# Patient Record
Sex: FEMALE | Race: WHITE | Employment: UNEMPLOYED | URBAN - METROPOLITAN AREA
[De-identification: names, ages, dates, MRNs, and addresses within clinical notes are randomized per-mention and may not be internally consistent; named-entity substitution may affect disease eponyms.]

---

## 2018-06-01 ENCOUNTER — HOSPITAL ENCOUNTER (EMERGENCY)
Age: 43
Discharge: HOME | End: 2018-06-02
Attending: EMERGENCY MEDICINE
Payer: MEDICAID

## 2018-06-01 VITALS
SYSTOLIC BLOOD PRESSURE: 194 MMHG | OXYGEN SATURATION: 100 % | WEIGHT: 219 LBS | DIASTOLIC BLOOD PRESSURE: 125 MMHG | RESPIRATION RATE: 14 BRPM | HEART RATE: 106 BPM | TEMPERATURE: 98 F

## 2018-06-01 DIAGNOSIS — R10.2 PELVIC PAIN IN FEMALE: Primary | ICD-10-CM

## 2018-06-01 DIAGNOSIS — Z91.14 NONCOMPLIANCE WITH MEDICATION REGIMEN: ICD-10-CM

## 2018-06-01 DIAGNOSIS — E11.65 TYPE 2 DIABETES MELLITUS WITH HYPERGLYCEMIA, WITHOUT LONG-TERM CURRENT USE OF INSULIN (HCC): ICD-10-CM

## 2018-06-01 PROCEDURE — 99284 EMERGENCY DEPT VISIT MOD MDM: CPT

## 2018-06-02 LAB
ANION GAP SERPL CALC-SCNC: 7 MMOL/L (ref 3–18)
APPEARANCE UR: CLEAR
BASOPHILS # BLD: 0 K/UL (ref 0–0.06)
BASOPHILS NFR BLD: 0 % (ref 0–2)
BILIRUB UR QL: NEGATIVE
BUN SERPL-MCNC: 16 MG/DL (ref 7–18)
BUN/CREAT SERPL: 22 (ref 12–20)
CALCIUM SERPL-MCNC: 8.6 MG/DL (ref 8.5–10.1)
CHLORIDE SERPL-SCNC: 101 MMOL/L (ref 100–108)
CO2 SERPL-SCNC: 27 MMOL/L (ref 21–32)
COLOR UR: YELLOW
CREAT SERPL-MCNC: 0.72 MG/DL (ref 0.6–1.3)
DIFFERENTIAL METHOD BLD: ABNORMAL
EOSINOPHIL # BLD: 0.2 K/UL (ref 0–0.4)
EOSINOPHIL NFR BLD: 2 % (ref 0–5)
ERYTHROCYTE [DISTWIDTH] IN BLOOD BY AUTOMATED COUNT: 13 % (ref 11.6–14.5)
GLUCOSE BLD STRIP.AUTO-MCNC: 274 MG/DL (ref 70–110)
GLUCOSE BLD STRIP.AUTO-MCNC: 355 MG/DL (ref 70–110)
GLUCOSE SERPL-MCNC: 362 MG/DL (ref 74–99)
GLUCOSE UR STRIP.AUTO-MCNC: >1000 MG/DL
HCG UR QL: NEGATIVE
HCT VFR BLD AUTO: 42.3 % (ref 35–45)
HGB BLD-MCNC: 14.7 G/DL (ref 12–16)
HGB UR QL STRIP: NEGATIVE
KETONES UR QL STRIP.AUTO: ABNORMAL MG/DL
LEUKOCYTE ESTERASE UR QL STRIP.AUTO: NEGATIVE
LYMPHOCYTES # BLD: 4.3 K/UL (ref 0.9–3.6)
LYMPHOCYTES NFR BLD: 39 % (ref 21–52)
MCH RBC QN AUTO: 31.1 PG (ref 24–34)
MCHC RBC AUTO-ENTMCNC: 34.8 G/DL (ref 31–37)
MCV RBC AUTO: 89.4 FL (ref 74–97)
MONOCYTES # BLD: 0.8 K/UL (ref 0.05–1.2)
MONOCYTES NFR BLD: 8 % (ref 3–10)
NEUTS SEG # BLD: 5.6 K/UL (ref 1.8–8)
NEUTS SEG NFR BLD: 51 % (ref 40–73)
NITRITE UR QL STRIP.AUTO: NEGATIVE
PH UR STRIP: 5.5 [PH] (ref 5–8)
PLATELET # BLD AUTO: 252 K/UL (ref 135–420)
PMV BLD AUTO: 11.8 FL (ref 9.2–11.8)
POTASSIUM SERPL-SCNC: 4.1 MMOL/L (ref 3.5–5.5)
PROT UR STRIP-MCNC: NEGATIVE MG/DL
RBC # BLD AUTO: 4.73 M/UL (ref 4.2–5.3)
SERVICE CMNT-IMP: NORMAL
SODIUM SERPL-SCNC: 135 MMOL/L (ref 136–145)
SP GR UR REFRACTOMETRY: >1.03 (ref 1–1.03)
UROBILINOGEN UR QL STRIP.AUTO: 0.2 EU/DL (ref 0.2–1)
WBC # BLD AUTO: 11 K/UL (ref 4.6–13.2)
WET PREP GENITAL: NORMAL

## 2018-06-02 PROCEDURE — 81025 URINE PREGNANCY TEST: CPT | Performed by: EMERGENCY MEDICINE

## 2018-06-02 PROCEDURE — 74011000250 HC RX REV CODE- 250: Performed by: EMERGENCY MEDICINE

## 2018-06-02 PROCEDURE — 96372 THER/PROPH/DIAG INJ SC/IM: CPT

## 2018-06-02 PROCEDURE — 81003 URINALYSIS AUTO W/O SCOPE: CPT | Performed by: EMERGENCY MEDICINE

## 2018-06-02 PROCEDURE — 80048 BASIC METABOLIC PNL TOTAL CA: CPT | Performed by: EMERGENCY MEDICINE

## 2018-06-02 PROCEDURE — 85025 COMPLETE CBC W/AUTO DIFF WBC: CPT | Performed by: EMERGENCY MEDICINE

## 2018-06-02 PROCEDURE — 82962 GLUCOSE BLOOD TEST: CPT

## 2018-06-02 PROCEDURE — 74011250637 HC RX REV CODE- 250/637: Performed by: EMERGENCY MEDICINE

## 2018-06-02 PROCEDURE — 74011636637 HC RX REV CODE- 636/637: Performed by: EMERGENCY MEDICINE

## 2018-06-02 PROCEDURE — 87591 N.GONORRHOEAE DNA AMP PROB: CPT | Performed by: EMERGENCY MEDICINE

## 2018-06-02 PROCEDURE — 74011250636 HC RX REV CODE- 250/636: Performed by: EMERGENCY MEDICINE

## 2018-06-02 PROCEDURE — 87210 SMEAR WET MOUNT SALINE/INK: CPT | Performed by: EMERGENCY MEDICINE

## 2018-06-02 PROCEDURE — 96360 HYDRATION IV INFUSION INIT: CPT

## 2018-06-02 RX ORDER — AZITHROMYCIN 250 MG/1
1000 TABLET, FILM COATED ORAL
Status: COMPLETED | OUTPATIENT
Start: 2018-06-02 | End: 2018-06-02

## 2018-06-02 RX ORDER — METFORMIN HYDROCHLORIDE 500 MG/1
500 TABLET ORAL 2 TIMES DAILY WITH MEALS
Qty: 30 TAB | Refills: 1 | Status: SHIPPED | OUTPATIENT
Start: 2018-06-02 | End: 2018-08-08 | Stop reason: DRUGHIGH

## 2018-06-02 RX ADMIN — SODIUM CHLORIDE 1000 ML: 900 INJECTION, SOLUTION INTRAVENOUS at 02:36

## 2018-06-02 RX ADMIN — LIDOCAINE HYDROCHLORIDE 250 MG: 10 INJECTION, SOLUTION EPIDURAL; INFILTRATION; INTRACAUDAL; PERINEURAL at 02:39

## 2018-06-02 RX ADMIN — INSULIN HUMAN 10 UNITS: 100 INJECTION, SOLUTION PARENTERAL at 02:37

## 2018-06-02 RX ADMIN — AZITHROMYCIN 1000 MG: 250 TABLET, FILM COATED ORAL at 02:39

## 2018-06-02 NOTE — DISCHARGE INSTRUCTIONS
Noninsulin Medicines for Type 2 Diabetes: Care Instructions  Your Care Instructions    There are different types of noninsulin medicines for diabetes. Each works in a different way. But they all help you control your blood sugar. Some types help your body make insulin to lower your blood sugar. Others lower how much insulin your body needs. Some can slow how fast your body digests sugars. And some can remove extra glucose through your urine. · Alpha-glucosidase inhibitors. These keep starches from breaking down. This means that they lower the amount of glucose absorbed when you eat. They don't help your body make more insulin. So they will not cause low blood sugar unless you use them with other medicines for diabetes. They include acarbose and miglitol. · DPP-4 inhibitors. These help your body raise the level of insulin after you eat. They also help your body make less of a hormone that raises blood sugar. They include linagliptin, saxagliptin, and sitagliptin. · Incretin hormones (GLP-1 receptor agonists). Your body makes a protein that can raise your insulin level. It also can lower your blood sugar and make you less hungry. You can get shots of hormones that work the same way. They include exenatide and liraglutide. · Meglitinides. These help your body release insulin. They also help slow how your body digests sugars. So they can keep your blood sugar from rising too fast after you eat. They include nateglinide and repaglinide. · Metformin. This lowers how much glucose your liver makes. And it helps you respond better to insulin. It also lowers the amount of stored sugar that your liver releases when you are not eating. · SGLT2 inhibitors. These help to remove extra glucose through your urine. They may also help some people lose weight. They include canagliflozin, dapagliflozin, and empagliflozin. · Sulfonylureas. These help your body release more insulin. Some work for many hours.  They can cause low blood sugar if you don't eat as you planned. They include glipizide and glyburide. · Thiazolidinediones. These reduce the amount of blood glucose. They also help you respond better to insulin. They include pioglitazone and rosiglitazone. You may need to take more than one medicine for diabetes. Two or more medicines may work better to lower your blood sugar level than just one does. Follow-up care is a key part of your treatment and safety. Be sure to make and go to all appointments, and call your doctor if you are having problems. It's also a good idea to know your test results and keep a list of the medicines you take. How can you care for yourself at home? · Eat a healthy diet. Get some exercise each day. This may help you to reduce how much medicine you need. · Do not take other prescription or over-the-counter medicines, vitamins, herbal products, or supplements without talking to your doctor first. Some medicines for type 2 diabetes can cause problems with other medicines or supplements. · Tell your doctor if you plan to get pregnant. Some of these drugs are not safe for pregnant women. · Be safe with medicines. Take your medicines exactly as prescribed. Meglitinides and sulfonylureas can cause your blood sugar to drop very low. Call your doctor if you think you are having a problem with your medicine. · Check your blood sugar often. You can use a glucose monitor. Keeping track can help you know how certain foods, activities, and medicines affect your blood sugar. And it can help you keep your blood sugar from getting so low that it's not safe. When should you call for help? Call 911 anytime you think you may need emergency care. For example, call if:  ? · You passed out (lost consciousness). ? · You are confused or cannot think clearly. ? · Your blood sugar is very high or very low. ? Watch closely for changes in your health, and be sure to contact your doctor if:  ? · Your blood sugar stays outside the level your doctor set for you. ? · You have any problems. Where can you learn more? Go to http://ann marie-perlita.info/. Enter H153 in the search box to learn more about \"Noninsulin Medicines for Type 2 Diabetes: Care Instructions. \"  Current as of: March 13, 2017  Content Version: 11.4  © 6142-0813 [employer]. Care instructions adapted under license by [employer] (which disclaims liability or warranty for this information). If you have questions about a medical condition or this instruction, always ask your healthcare professional. [place] [address] any warranty or liability for your use of this information. Learning About Diabetes Food Guidelines  Your Care Instructions    Meal planning is important to manage diabetes. It helps keep your blood sugar at a target level (which you set with your doctor). You don't have to eat special foods. You can eat what your family eats, including sweets once in a while. But you do have to pay attention to how often you eat and how much you eat of certain foods. You may want to work with a dietitian or a certified diabetes educator (CDE) to help you plan meals and snacks. A dietitian or CDE can also help you lose weight if that is one of your goals. What should you know about eating carbs? Managing the amount of carbohydrate (carbs) you eat is an important part of healthy meals when you have diabetes. Carbohydrate is found in many foods. · Learn which foods have carbs. And learn the amounts of carbs in different foods. ¨ Bread, cereal, pasta, and rice have about 15 grams of carbs in a serving. A serving is 1 slice of bread (1 ounce), ½ cup of cooked cereal, or 1/3 cup of cooked pasta or rice. ¨ Fruits have 15 grams of carbs in a serving.  A serving is 1 small fresh fruit, such as an apple or orange; ½ of a banana; ½ cup of cooked or canned fruit; ½ cup of fruit juice; 1 cup of melon or raspberries; or 2 tablespoons of dried fruit. ¨ Milk and no-sugar-added yogurt have 15 grams of carbs in a serving. A serving is 1 cup of milk or 2/3 cup of no-sugar-added yogurt. ¨ Starchy vegetables have 15 grams of carbs in a serving. A serving is ½ cup of mashed potatoes or sweet potato; 1 cup winter squash; ½ of a small baked potato; ½ cup of cooked beans; or ½ cup cooked corn or green peas. · Learn how much carbs to eat each day and at each meal. A dietitian or CDE can teach you how to keep track of the amount of carbs you eat. This is called carbohydrate counting. · If you are not sure how to count carbohydrate grams, use the Plate Method to plan meals. It is a good, quick way to make sure that you have a balanced meal. It also helps you spread carbs throughout the day. ¨ Divide your plate by types of foods. Put non-starchy vegetables on half the plate, meat or other protein food on one-quarter of the plate, and a grain or starchy vegetable in the final quarter of the plate. To this you can add a small piece of fruit and 1 cup of milk or yogurt, depending on how many carbs you are supposed to eat at a meal.  · Try to eat about the same amount of carbs at each meal. Do not \"save up\" your daily allowance of carbs to eat at one meal.  · Proteins have very little or no carbs per serving. Examples of proteins are beef, chicken, turkey, fish, eggs, tofu, cheese, cottage cheese, and peanut butter. A serving size of meat is 3 ounces, which is about the size of a deck of cards. Examples of meat substitute serving sizes (equal to 1 ounce of meat) are 1/4 cup of cottage cheese, 1 egg, 1 tablespoon of peanut butter, and ½ cup of tofu. How can you eat out and still eat healthy? · Learn to estimate the serving sizes of foods that have carbohydrate. If you measure food at home, it will be easier to estimate the amount in a serving of restaurant food.   · If the meal you order has too much carbohydrate (such as potatoes, corn, or baked beans), ask to have a low-carbohydrate food instead. Ask for a salad or green vegetables. · If you use insulin, check your blood sugar before and after eating out to help you plan how much to eat in the future. · If you eat more carbohydrate at a meal than you had planned, take a walk or do other exercise. This will help lower your blood sugar. What else should you know? · Limit saturated fat, such as the fat from meat and dairy products. This is a healthy choice because people who have diabetes are at higher risk of heart disease. So choose lean cuts of meat and nonfat or low-fat dairy products. Use olive or canola oil instead of butter or shortening when cooking. · Don't skip meals. Your blood sugar may drop too low if you skip meals and take insulin or certain medicines for diabetes. · Check with your doctor before you drink alcohol. Alcohol can cause your blood sugar to drop too low. Alcohol can also cause a bad reaction if you take certain diabetes medicines. Follow-up care is a key part of your treatment and safety. Be sure to make and go to all appointments, and call your doctor if you are having problems. It's also a good idea to know your test results and keep a list of the medicines you take. Where can you learn more? Go to http://ann marie-perlita.info/. Enter Q234 in the search box to learn more about \"Learning About Diabetes Food Guidelines. \"  Current as of: March 13, 2017  Content Version: 11.4  © 9694-7120 [employer]. Care instructions adapted under license by [employer] (which disclaims liability or warranty for this information). If you have questions about a medical condition or this instruction, always ask your healthcare professional. [place] [address] any warranty or liability for your use of this information.

## 2018-06-02 NOTE — ED PROVIDER NOTES
HPI Comments: Porter Arenas is a 37 y.o. Female presents to ED c/o B pelvic pain L > R for 2 days worse tonight with increased vaginal discharge has had unprotected intercourse LMP 3 weeks ago also c/o increased urgency and dysuria     Social neg for alcohol     Surgical s/p BTL     Patient is a 37 y.o. female presenting with pelvic pain. Pelvic Pain    Associated symptoms include dysuria and frequency. Pertinent negatives include no fever, no diarrhea, no nausea, no vomiting, no chest pain and no back pain. Past Medical History:   Diagnosis Date    Diabetes ([address]        No past surgical history on file. No family history on file. Social History     Social History    Marital status: SINGLE     Spouse name: N/A    Number of children: N/A    Years of education: N/A     Occupational History    Not on file. Social History Main Topics    Smoking status: Current Every Day Smoker    Smokeless tobacco: Never Used    Alcohol use No    Drug use: No    Sexual activity: Not on file     Other Topics Concern    Not on file     Social History Narrative    No narrative on file         ALLERGIES: Review of patient's allergies indicates no known allergies. Review of Systems   Constitutional: Negative for activity change, appetite change and fever. HENT: Negative for congestion. Eyes: Negative. Respiratory: Negative for shortness of breath. Cardiovascular: Negative for chest pain. Gastrointestinal: Positive for abdominal pain. Negative for diarrhea, nausea and vomiting. Endocrine: Positive for polydipsia. Genitourinary: Positive for dysuria, frequency, pelvic pain, urgency and vaginal discharge. Negative for vaginal bleeding and vaginal pain. Musculoskeletal: Negative for back pain. Neurological: Positive for weakness. Negative for dizziness.        Vitals:    06/01/18 2306   BP: (!) 194/125   Pulse: (!) 106   Resp: 14   Temp: 98 °F (36.7 °C)   SpO2: 100%   Weight: 99.3 kg (219 lb)            Physical Exam   Constitutional: She appears well-developed and well-nourished. HENT:   Head: Normocephalic and atraumatic. Mouth/Throat: Oropharynx is clear and moist.   Eyes: Conjunctivae are normal. Pupils are equal, round, and reactive to light. Neck: Normal range of motion. Neck supple. Cardiovascular: Normal rate, regular rhythm and normal heart sounds. Pulmonary/Chest: Effort normal and breath sounds normal.   Abdominal: Soft. Bowel sounds are normal. There is no tenderness. Genitourinary: Uterus is not enlarged. Cervix exhibits no motion tenderness and no discharge. Right adnexum displays no tenderness. Left adnexum displays no tenderness. No tenderness in the vagina. No vaginal discharge found. Nursing note and vitals reviewed. MDM  Number of Diagnoses or Management Options  Pelvic pain in female:   Diagnosis management comments: Vaginitis pt offered rocephin and zithromax in ED noted Glucose in UA acucheck 355 will start fluids and insulin bolus 10 units in ED ua neg care transferred to Dr Rodrigo Naik for disposition        Amount and/or Complexity of Data Reviewed  Clinical lab tests: ordered          ED Course       Procedures      .

## 2018-06-04 LAB
C TRACH RRNA SPEC QL NAA+PROBE: NEGATIVE
N GONORRHOEA RRNA SPEC QL NAA+PROBE: NEGATIVE
SPECIMEN SOURCE: NORMAL

## 2018-07-31 ENCOUNTER — OFFICE VISIT (OUTPATIENT)
Dept: FAMILY MEDICINE CLINIC | Age: 43
End: 2018-07-31

## 2018-07-31 VITALS
HEIGHT: 66 IN | BODY MASS INDEX: 36.35 KG/M2 | WEIGHT: 226.2 LBS | RESPIRATION RATE: 18 BRPM | SYSTOLIC BLOOD PRESSURE: 132 MMHG | HEART RATE: 87 BPM | DIASTOLIC BLOOD PRESSURE: 79 MMHG | TEMPERATURE: 97.6 F | OXYGEN SATURATION: 97 %

## 2018-07-31 DIAGNOSIS — M25.50 ARTHRALGIA, UNSPECIFIED JOINT: ICD-10-CM

## 2018-07-31 DIAGNOSIS — Z86.39 HISTORY OF VITAMIN D DEFICIENCY: ICD-10-CM

## 2018-07-31 DIAGNOSIS — Z76.89 ENCOUNTER TO ESTABLISH CARE: ICD-10-CM

## 2018-07-31 DIAGNOSIS — Z86.39 HISTORY OF TYPE 2 DIABETES MELLITUS: Primary | ICD-10-CM

## 2018-07-31 DIAGNOSIS — R39.15 URGENCY OF URINATION: ICD-10-CM

## 2018-07-31 DIAGNOSIS — Z12.31 ENCOUNTER FOR SCREENING MAMMOGRAM FOR BREAST CANCER: ICD-10-CM

## 2018-07-31 DIAGNOSIS — E66.01 SEVERE OBESITY (BMI 35.0-39.9): ICD-10-CM

## 2018-07-31 DIAGNOSIS — F17.210 CIGARETTE SMOKER: ICD-10-CM

## 2018-07-31 LAB
BILIRUB UR QL STRIP: NEGATIVE
GLUCOSE UR-MCNC: NORMAL MG/DL
KETONES P FAST UR STRIP-MCNC: NEGATIVE MG/DL
PH UR STRIP: 7 [PH] (ref 4.6–8)
PROT UR QL STRIP: NEGATIVE
SP GR UR STRIP: 1.01 (ref 1–1.03)
UA UROBILINOGEN AMB POC: NORMAL (ref 0.2–1)
URINALYSIS CLARITY POC: CLEAR
URINALYSIS COLOR POC: YELLOW
URINE BLOOD POC: NEGATIVE
URINE LEUKOCYTES POC: NEGATIVE
URINE NITRITES POC: NEGATIVE

## 2018-07-31 RX ORDER — IBUPROFEN 800 MG/1
800 TABLET ORAL
Refills: 1 | COMMUNITY
Start: 2018-07-09 | End: 2018-08-08 | Stop reason: ALTCHOICE

## 2018-07-31 NOTE — PROGRESS NOTES
Nikita Garcia is a 37 y.o.  female and presents with    Chief Complaint   Patient presents with   [facility] Establish Care    Joint Pain       Subjective:  Ms. Squire Phoenix presents today as a new patient to establish care. She has history of diabetes. She reports this is a long standing problem. Prior to her recent ED visit (18) she had not been on medication in over a year. She was in a financial constraint and did not have health insurance. Today, she reports joint pain that has been present for the past 2 months. She states she just started working at [employer]. She is walking all day and putting items away. Initially, she thought her symptoms were related the new job but now symptoms are still present. In the mornings symptoms are worse. With movement symptoms improve. Pain is located in the shoulders, elbows, wrists, hips, knees, and low back. If pain is severe she will take ibuprofen 800mg. She reports urine urgency. She denies pain. Several times she has barely made it to the bathroom. She has history of vitamin D Deficiency. Additional Concerns: Ms. Squire Phoenix is here to establish care. There is no problem list on file for this patient. Current Outpatient Prescriptions   Medication Sig Dispense Refill    ibuprofen (MOTRIN) 800 mg tablet Take 800 mg by mouth. 1    metFORMIN (GLUCOPHAGE) 500 mg tablet Take 1 Tab by mouth two (2) times daily (with meals).  30 Tab 1     No Known Allergies  Past Medical History:   Diagnosis Date    Diabetes ([address]     Hypercholesterolemia      Past Surgical History:   Procedure Laterality Date    HX GYN       x 2     Family History   Problem Relation Age of Onset    Psychiatric Disorder Mother      Schizophrenia    No Known Problems Father     Thyroid Disease Maternal Grandmother     Hypertension Maternal Grandmother     Hypertension Maternal Grandfather      Social History   Substance Use Topics    Smoking status: Current Every Day Smoker     Packs/day: 0.50     Years: 20.00    Smokeless tobacco: Never Used    Alcohol use Yes      Comment: rarely       ROS   History obtained from the patient  General ROS: negative for - chills or fever  Ophthalmic ROS: negative for - blurry vision  Respiratory ROS: no cough, shortness of breath, or wheezing  Cardiovascular ROS: no chest pain or dyspnea on exertion  Gastrointestinal ROS: no abdominal pain, change in bowel habits, or black or bloody stools  Genito-Urinary ROS: positive for - urinary frequency/urgency  Musculoskeletal ROS: positive for - joint pain and joint stiffness  Neurological ROS: no TIA or stroke symptoms    All other systems reviewed and are negative.       Objective:  Vitals:    07/31/18 0820   BP: 132/79   Pulse: 87   Resp: 18   Temp: 97.6 °F (36.4 °C)   TempSrc: Oral   SpO2: 97%   Weight: 226 lb 3.2 oz (102.6 kg)   Height: 5' 5.5\" (1.664 m)   PainSc:   7   PainLoc: Generalized   LMP: 07/05/2018       PE  General appearance - alert, well appearing, and in no distress  Mental status - normal mood, behavior, speech, dress, motor activity, and thought processes  Neck - supple, no significant adenopathy  Chest - clear to auscultation, no wheezes, rales or rhonchi, symmetric air entry  Heart - normal rate and regular rhythm  Abdomen - soft, nontender, nondistended, no masses or organomegaly  Musculoskeletal - generalized joint pain/tenderness; no erythema or swelling noted; crepitus with flexion and extension of both legs  Extremities - peripheral pulses normal, no pedal edema, no clubbing or cyanosis  Skin - normal coloration and turgor, no rashes, no suspicious skin lesions noted      LABS   7/31/2018 10:10 AM - Niko Alston LPN   Component Results   Component Value Flag Ref Range Units Status   Color (UA POC) Yellow    Final   Clarity (UA POC) Clear    Final   Glucose (UA POC) 2+  Negative  Final   Bilirubin (UA POC) Negative  Negative  Final   Ketones (UA POC) Negative  Negative  Final   Specific gravity (UA POC) 1.015  1.001 - 1.035  Final   Blood (UA POC) Negative  Negative  Final   pH (UA POC) 7.0  4.6 - 8.0  Final   Protein (UA POC) Negative  Negative  Final   Urobilinogen (UA POC) 0.2 mg/dL  0.2 - 1  Final   Nitrites (UA POC) Negative  Negative  Final   Leukocyte esterase (UA POC) Negative  Negative  Final         Assessment/Plan:    1. History of Type 2 Diabetes- recently restarted on metformin but has not been on meds in over a year; hemoglobin a1c today;     2. History of Vitamin D Deficiency- vitamin d level today    3. Urinary Urgency- POC UA + for glucose; symptoms may be related to uncontrolled diabetes; pending labs    4. Arthralgia- Generalized symptoms; symptoms worse in the morning and improve with movement; consistent with arthritis; will check uric acid level today    5. Obesity- BMI 37.07; has lost about 40 pounds in the last year; labs today;     6. Tobacco abuse- The patient was counseled on the dangers of tobacco use, and was advised to quit and reluctant to quit. Reviewed strategies to maximize success, including written materials. Total time spent in discussion: 3 minutes     Lab review: orders written for new lab studies as appropriate; see orders      Today's Visit: CBC, CMP, Vitamin D, Lipid Panel, Hemoglobin a1c, TSH and Free T4    Health Maintenance:   Mammogram- orders placed    I have discussed the diagnosis with the patient and the intended plan as seen in the above orders. The patient has received an after-visit summary and questions were answered concerning future plans. I have discussed medication side effects and warnings with the patient as well. I have reviewed the plan of care with the patient, accepted their input and they are in agreement with the treatment goals. Follow-up Disposition:  Return in about 1 week (around 8/7/2018) for follow up labs.   More than 1/2 of this 30 minute visit was spent in counseling and coordination of care, as described above.     FRED Cloud

## 2018-07-31 NOTE — PROGRESS NOTES
Parul Carr is a 37 y.o. female  Chief Complaint   Patient presents with   [facility]    Joint Pain     1. Have you been to the ER, urgent care clinic since your last visit? Hospitalized since your last visit? Yes Reason for visit: 6/1/18, abdomen pain    2. Have you seen or consulted any other health care providers outside of the [address] [place] [address] since your last visit? Include any pap smears or colon screening.  No

## 2018-07-31 NOTE — PATIENT INSTRUCTIONS
Learning About Benefits From Quitting Smoking  How does quitting smoking make you healthier? If you're thinking about quitting smoking, you may have a few reasons to be smoke-free. Your health may be one of them. · When you quit smoking, you lower your risks for cancer, lung disease, heart attack, stroke, blood vessel disease, and blindness from macular degeneration. · When you're smoke-free, you get sick less often, and you heal faster. You are less likely to get colds, flu, bronchitis, and pneumonia. · As a nonsmoker, you may find that your mood is better and you are less stressed. When and how will you feel healthier? Quitting has real health benefits that start from day 1 of being smoke-free. And the longer you stay smoke-free, the healthier you get and the better you feel. The first hours  · After just 20 minutes, your blood pressure and heart rate go down. That means there's less stress on your heart and blood vessels. · Within 12 hours, the level of carbon monoxide in your blood drops back to normal. That makes room for more oxygen. With more oxygen in your body, you may notice that you have more energy than when you smoked. After 2 weeks  · Your lungs start to work better. · Your risk of heart attack starts to drop. After 1 month  · When your lungs are clear, you cough less and breathe deeper, so it's easier to be active. · Your sense of taste and smell return. That means you can enjoy food more than you have since you started smoking. Over the years  · After 1 year, your risk of heart disease is half what it would be if you kept smoking. · After 5 years, your risk of stroke starts to shrink. Within a few years after that, it's about the same as if you'd never smoked. · After 10 years, your risk of dying from lung cancer is cut by about half. And your risk for many other types of cancer is lower too. How would quitting help others in your life?   When you quit smoking, you improve the health of everyone who now breathes in your smoke. · Their heart, lung, and cancer risks drop, much like yours. · They are sick less. For babies and small children, living smoke-free means they're less likely to have ear infections, pneumonia, and bronchitis. · If you're a woman who is or will be pregnant someday, quitting smoking means a healthier . · Children who are close to you are less likely to become adult smokers. Where can you learn more? Go to http://ann marie-perlita.info/. Enter 052 [id number] 72 11 in the search box to learn more about \"Learning About Benefits From Quitting Smoking. \"  Current as of: 2017  Content Version: 11.7  © 9563-7284 [employer]. Care instructions adapted under license by [employer] (which disclaims liability or warranty for this information). If you have questions about a medical condition or this instruction, always ask your healthcare professional. [place] [address] any warranty or liability for your use of this information. Eating Healthy Foods: Care Instructions  Your Care Instructions    Eating healthy foods can help lower your risk for disease. Healthy food gives you energy and keeps your heart strong, your brain active, your muscles working, and your bones strong. A healthy diet includes a variety of foods from the basic food groups: grains, vegetables, fruits, milk and milk products, and meat and beans. Some people may eat more of their favorite foods from only one food group and, as a result, miss getting the nutrients they need. So, it is important to pay attention not only to what you eat but also to what you are missing from your diet. You can eat a healthy, balanced diet by making a few small changes. Follow-up care is a key part of your treatment and safety. Be sure to make and go to all appointments, and call your doctor if you are having problems.  It's also a good idea to know your test results and keep a list of the medicines you take. How can you care for yourself at home? Look at what you eat  · Keep a food diary for a week or two and record everything you eat or drink. Track the number of servings you eat from each food group. · For a balanced diet every day, eat a variety of:  ¨ 6 or more ounce-equivalents of grains, such as cereals, breads, crackers, rice, or pasta, every day. An ounce-equivalent is 1 slice of bread, 1 cup of ready-to-eat cereal, or ½ cup of cooked rice, cooked pasta, or cooked cereal.  ¨ 2½ cups of vegetables, especially:  § Dark-green vegetables such as broccoli and spinach. § Orange vegetables such as carrots and sweet potatoes. § Dry beans (such as persaud and kidney beans) and peas (such as lentils). ¨ 2 cups of fresh, frozen, or canned fruit. A small apple or 1 banana or orange equals 1 cup. ¨ 3 cups of nonfat or low-fat milk, yogurt, or other milk products. ¨ 5½ ounces of meat and beans, such as chicken, fish, lean meat, beans, nuts, and seeds. One egg, 1 tablespoon of peanut butter, ½ ounce nuts or seeds, or ¼ cup of cooked beans equals 1 ounce of meat. · Learn how to read food labels for serving sizes and ingredients. Fast-food and convenience-food meals often contain few or no fruits or vegetables. Make sure you eat some fruits and vegetables to make the meal more nutritious. · Look at your food diary. For each food group, add up what you have eaten and then divide the total by the number of days. This will give you an idea of how much you are eating from each food group. See if you can find some ways to change your diet to make it more healthy. Start small  · Do not try to make dramatic changes to your diet all at once. You might feel that you are missing out on your favorite foods and then be more likely to fail. · Start slowly, and gradually change your habits. Try some of the following:  ¨ Use whole wheat bread instead of white bread.   ¨ Use nonfat or low-fat milk instead of whole milk. ¨ Eat brown rice instead of white rice, and eat whole wheat pasta instead of white-flour pasta. ¨ Try low-fat cheeses and low-fat yogurt. ¨ Add more fruits and vegetables to meals and have them for snacks. ¨ Add lettuce, tomato, cucumber, and onion to sandwiches. ¨ Add fruit to yogurt and cereal.  Enjoy food  · You can still eat your favorite foods. You just may need to eat less of them. If your favorite foods are high in fat, salt, and sugar, limit how often you eat them, but do not cut them out entirely. · Eat a wide variety of foods. Make healthy choices when eating out  · The type of restaurant you choose can help you make healthy choices. Even fast-food chains are now offering more low-fat or healthier choices on the menu. · Choose smaller portions, or take half of your meal home. · When eating out, try:  ¨ A veggie pizza with a whole wheat crust or grilled chicken (instead of sausage or pepperoni). ¨ Pasta with roasted vegetables, grilled chicken, or marinara sauce instead of cream sauce. ¨ A vegetable wrap or grilled chicken wrap. ¨ Broiled or poached food instead of fried or breaded items. Make healthy choices easy  · Buy packaged, prewashed, ready-to-eat fresh vegetables and fruits, such as baby carrots, salad mixes, and chopped or shredded broccoli and cauliflower. · Buy packaged, presliced fruits, such as melon or pineapple. · Choose 100% fruit or vegetable juice instead of soda. Limit juice intake to 4 to 6 oz (½ to ¾ cup) a day. · Blend low-fat yogurt, fruit juice, and canned or frozen fruit to make a smoothie for breakfast or a snack. Where can you learn more? Go to http://ann marie-perlita.info/. Enter T756 in the search box to learn more about \"Eating Healthy Foods: Care Instructions. \"  Current as of: May 12, 2017  Content Version: 11.7  © 6587-4567 [employer]imalize[employer], [employer].  Care instructions adapted under license by [employer] (which disclaims liability or warranty for this information). If you have questions about a medical condition or this instruction, always ask your healthcare professional. [address] any warranty or liability for your use of this information.

## 2018-08-01 LAB
A-G RATIO,AGRAT: 1.5 RATIO (ref 1.1–2.6)
ALBUMIN SERPL-MCNC: 4.1 G/DL (ref 3.5–5)
ALP SERPL-CCNC: 69 U/L (ref 25–115)
ALT SERPL-CCNC: 24 U/L (ref 5–40)
ANION GAP SERPL CALC-SCNC: 18 MMOL/L
AST SERPL W P-5'-P-CCNC: 19 U/L (ref 10–37)
AVG GLU, 10930: 258 MG/DL (ref 91–123)
BILIRUB SERPL-MCNC: 0.3 MG/DL (ref 0.2–1.2)
BUN SERPL-MCNC: 12 MG/DL (ref 6–22)
CALCIUM SERPL-MCNC: 9 MG/DL (ref 8.4–10.5)
CHLORIDE SERPL-SCNC: 96 MMOL/L (ref 98–110)
CHOLEST SERPL-MCNC: 226 MG/DL (ref 110–200)
CO2 SERPL-SCNC: 24 MMOL/L (ref 20–32)
CREAT SERPL-MCNC: 0.4 MG/DL (ref 0.5–1.2)
CREATININE, URINE: 43 MG/DL
ERYTHROCYTE [DISTWIDTH] IN BLOOD BY AUTOMATED COUNT: 13.8 % (ref 10–15.5)
GFRAA, 66117: >60
GFRNA, 66118: >60
GLOBULIN,GLOB: 2.8 G/DL (ref 2–4)
GLUCOSE SERPL-MCNC: 278 MG/DL (ref 70–99)
HBA1C MFR BLD HPLC: 10.6 % (ref 4.8–5.9)
HCT VFR BLD AUTO: 45.9 % (ref 35.1–46.5)
HDLC SERPL-MCNC: 38 MG/DL (ref 40–59)
HDLC SERPL-MCNC: 5.9 MG/DL (ref 0–5)
HGB BLD-MCNC: 14.8 G/DL (ref 11.7–15.5)
LDLC SERPL CALC-MCNC: 163 MG/DL (ref 50–99)
MCH RBC QN AUTO: 31 PG (ref 26–34)
MCHC RBC AUTO-ENTMCNC: 32 G/DL (ref 31–36)
MCV RBC AUTO: 97 FL (ref 80–95)
MICROALB/CREAT RATIO, 140286: NORMAL MCG/MG OF CREATININE (ref 0–30)
MICROALBUMIN,URINE RANDOM 140054: <12 UG/ML (ref 0.1–17)
PLATELET # BLD AUTO: 273 K/UL (ref 140–440)
PMV BLD AUTO: 11.9 FL (ref 9–13)
POTASSIUM SERPL-SCNC: 4.7 MMOL/L (ref 3.5–5.5)
PROT SERPL-MCNC: 6.9 G/DL (ref 6.4–8.3)
RBC # BLD AUTO: 4.75 M/UL (ref 3.8–5.2)
SODIUM SERPL-SCNC: 138 MMOL/L (ref 133–145)
T4 FREE SERPL-MCNC: 1.2 NG/DL (ref 0.9–1.8)
TRIGL SERPL-MCNC: 126 MG/DL (ref 40–149)
TSH SERPL DL<=0.005 MIU/L-ACNC: 0.65 MCU/ML (ref 0.27–4.2)
URATE SERPL-MCNC: 2.4 MG/DL (ref 2.2–7.7)
VLDLC SERPL CALC-MCNC: 25 MG/DL (ref 8–30)
WBC # BLD AUTO: 8.4 K/UL (ref 4–11)

## 2018-08-02 LAB — CALCITRIOL, 081092: 65.2 PG/ML

## 2018-08-08 ENCOUNTER — OFFICE VISIT (OUTPATIENT)
Dept: FAMILY MEDICINE CLINIC | Age: 43
End: 2018-08-08

## 2018-08-08 VITALS
DIASTOLIC BLOOD PRESSURE: 84 MMHG | SYSTOLIC BLOOD PRESSURE: 138 MMHG | TEMPERATURE: 96.8 F | BODY MASS INDEX: 36 KG/M2 | HEART RATE: 78 BPM | WEIGHT: 224 LBS | OXYGEN SATURATION: 96 % | HEIGHT: 66 IN | RESPIRATION RATE: 18 BRPM

## 2018-08-08 DIAGNOSIS — M25.50 GENERALIZED JOINT PAIN: ICD-10-CM

## 2018-08-08 DIAGNOSIS — E66.01 SEVERE OBESITY (BMI 35.0-39.9): ICD-10-CM

## 2018-08-08 DIAGNOSIS — E78.2 MIXED HYPERLIPIDEMIA: ICD-10-CM

## 2018-08-08 DIAGNOSIS — E11.65 TYPE 2 DIABETES MELLITUS WITH HYPERGLYCEMIA, WITHOUT LONG-TERM CURRENT USE OF INSULIN (HCC): Primary | ICD-10-CM

## 2018-08-08 LAB
LEFT EYE DIABETIC RETINOPATHY: NORMAL
LEFT EYE IMAGE QUALITY: NORMAL
LEFT EYE MACULAR EDEMA: NORMAL
LEFT EYE OTHER RETINOPATHY: NORMAL
RESULT: NORMAL
RIGHT EYE DIABETIC RETINOPATHY: NORMAL
RIGHT EYE IMAGE QUALITY: NORMAL
RIGHT EYE MACULAR EDEMA: NORMAL
RIGHT EYE OTHER RETINOPATHY: NORMAL
SEVERITY: NORMAL

## 2018-08-08 RX ORDER — METFORMIN HYDROCHLORIDE 1000 MG/1
1000 TABLET ORAL 2 TIMES DAILY WITH MEALS
Qty: 60 TAB | Refills: 2 | Status: SHIPPED | OUTPATIENT
Start: 2018-08-08 | End: 2018-09-07 | Stop reason: DRUGHIGH

## 2018-08-08 RX ORDER — ATORVASTATIN CALCIUM 20 MG/1
20 TABLET, FILM COATED ORAL DAILY
Qty: 30 TAB | Refills: 2 | Status: SHIPPED | OUTPATIENT
Start: 2018-08-08

## 2018-08-08 RX ORDER — LOSARTAN POTASSIUM 25 MG/1
25 TABLET ORAL DAILY
Qty: 30 TAB | Refills: 2 | Status: SHIPPED | OUTPATIENT
Start: 2018-08-08

## 2018-08-08 RX ORDER — NAPROXEN 500 MG/1
500 TABLET ORAL 2 TIMES DAILY WITH MEALS
Qty: 60 TAB | Refills: 1 | Status: SHIPPED | OUTPATIENT
Start: 2018-08-08 | End: 2018-10-22 | Stop reason: SDUPTHER

## 2018-08-08 NOTE — PATIENT INSTRUCTIONS

## 2018-08-08 NOTE — PROGRESS NOTES
Marti Kimble is a 37 y.o. female  Chief Complaint   Patient presents with    Labs     1. Have you been to the ER, urgent care clinic since your last visit? Hospitalized since your last visit? No    2. Have you seen or consulted any other health care providers outside of the [address] [place] [address] since your last visit? Include any pap smears or colon screening.  No

## 2018-08-08 NOTE — MR AVS SNAPSHOT
[address] [place] [id number] 
[phone] Patient: David Verma MRN: [id number] :1975 Visit Information Date & Time Provider Department Dept. Phone Encounter #  
 2018  9:00 AM Osbaldo Manzanares, [address] [id number] [phone] Follow-up Instructions Return in about 1 month (around 2018) for follow up diabetes and joint pain. Upcoming Health Maintenance Date Due Pneumococcal 19-64 Medium Risk (1 of 1 - PPSV23) 3/22/1994 DTaP/Tdap/Td series (1 - Tdap) 3/22/1996 PAP AKA CERVICAL CYTOLOGY 3/22/1996 Influenza Age 5 to Adult 2018 Allergies as of 2018  Review Complete On: 2018 By: Osbaldo Manzanares NP No Known Allergies Current Immunizations  Never Reviewed No immunizations on file. Not reviewed this visit You Were Diagnosed With   
  
 Codes Comments Type 2 diabetes mellitus with hyperglycemia, without long-term current use of insulin (HCC)    -  Primary ICD-10-CM: E11.65 ICD-9-CM: 250.00, 790.29 Mixed hyperlipidemia     ICD-10-CM: E78.2 ICD-9-CM: 272.2 Severe obesity (BMI 35.0-39.9) (HCC)     ICD-10-CM: E66.01 
ICD-9-CM: 278.01 Generalized joint pain     ICD-10-CM: M25.50 ICD-9-CM: 719.40 Vitals BP Pulse Temp Resp Height(growth percentile) Weight(growth percentile) 138/84 (BP 1 Location: Right arm, BP Patient Position: Sitting) 78 96.8 °F (36 °C) (Oral) 18 5' 5.5\" (1.664 m) 224 lb (101.6 kg) LMP SpO2 BMI OB Status Smoking Status 2018 96% 36.71 kg/m2 Having regular periods Current Every Day Smoker Vitals History BMI and BSA Data Body Mass Index Body Surface Area  
 36.71 kg/m 2 2.17 m 2 Preferred Pharmacy Pharmacy Name Phone [id number] [address] [phone] Your Updated Medication List  
  
   
 This list is accurate as of 8/8/18  9:41 AM.  Always use your most recent med list.  
  
  
  
  
 atorvastatin 20 mg tablet Commonly known as:  LIPITOR Take 1 Tab by mouth daily. losartan 25 mg tablet Commonly known as:  COZAAR Take 1 Tab by mouth daily. metFORMIN 1,000 mg tablet Commonly known as:  GLUCOPHAGE Take 1 Tab by mouth two (2) times daily (with meals). naproxen 500 mg tablet Commonly known as:  NAPROSYN Take 1 Tab by mouth two (2) times daily (with meals). SITagliptin 100 mg tablet Commonly known as:  Angeli Stake Take 1 Tab by mouth daily. Prescriptions Sent to Pharmacy Refills  
 metFORMIN (GLUCOPHAGE) 1,000 mg tablet 2 Sig: Take 1 Tab by mouth two (2) times daily (with meals). Class: Normal  
 [facility] Ph #: [phone] Route: Oral  
 atorvastatin (LIPITOR) 20 mg tablet 2 Sig: Take 1 Tab by mouth daily. Class: Normal  
 Pharmacy: [address] [place] [address] Ph #: [phone] Route: Oral  
 losartan (COZAAR) 25 mg tablet 2 Sig: Take 1 Tab by mouth daily. Class: Normal  
 Pharmacy: [address] [place] [address] Ph #: [phone] Route: Oral  
 SITagliptin (JANUVIA) 100 mg tablet 2 Sig: Take 1 Tab by mouth daily. Class: Normal  
 Pharmacy: [facility] [address] Ph #: [phone] Route: Oral  
 naproxen (NAPROSYN) 500 mg tablet 1 Sig: Take 1 Tab by mouth two (2) times daily (with meals). Class: Normal  
 Pharmacy: [address] [place] [address] Ph #: [phone] Route: Oral  
  
We Performed the Following FUNDUS PHOTOGRAPHY [id number] CPT(R)]  DIABETES FOOT EXAM [7 Custom] Follow-up Instructions Return in about 1 month (around 9/8/2018) for follow up diabetes and joint pain. Patient Instructions Learning About Diabetes Food Guidelines Your Care Instructions Meal planning is important to manage diabetes. It helps keep your blood sugar at a target level (which you set with your doctor). You don't have to eat special foods. You can eat what your family eats, including sweets once in a while. But you do have to pay attention to how often you eat and how much you eat of certain foods. You may want to work with a dietitian or a certified diabetes educator (CDE) to help you plan meals and snacks. A dietitian or CDE can also help you lose weight if that is one of your goals. What should you know about eating carbs? Managing the amount of carbohydrate (carbs) you eat is an important part of healthy meals when you have diabetes. Carbohydrate is found in many foods. · Learn which foods have carbs. And learn the amounts of carbs in different foods. ¨ Bread, cereal, pasta, and rice have about 15 grams of carbs in a serving. A serving is 1 slice of bread (1 ounce), ½ cup of cooked cereal, or 1/3 cup of cooked pasta or rice. ¨ Fruits have 15 grams of carbs in a serving. A serving is 1 small fresh fruit, such as an apple or orange; ½ of a banana; ½ cup of cooked or canned fruit; ½ cup of fruit juice; 1 cup of melon or raspberries; or 2 tablespoons of dried fruit. ¨ Milk and no-sugar-added yogurt have 15 grams of carbs in a serving. A serving is 1 cup of milk or 2/3 cup of no-sugar-added yogurt. ¨ Starchy vegetables have 15 grams of carbs in a serving. A serving is ½ cup of mashed potatoes or sweet potato; 1 cup winter squash; ½ of a small baked potato; ½ cup of cooked beans; or ½ cup cooked corn or green peas. · Learn how much carbs to eat each day and at each meal. A dietitian or CDE can teach you how to keep track of the amount of carbs you eat. This is called carbohydrate counting.  
· If you are not sure how to count carbohydrate grams, use the Plate Method to plan meals. It is a good, quick way to make sure that you have a balanced meal. It also helps you spread carbs throughout the day. ¨ Divide your plate by types of foods. Put non-starchy vegetables on half the plate, meat or other protein food on one-quarter of the plate, and a grain or starchy vegetable in the final quarter of the plate. To this you can add a small piece of fruit and 1 cup of milk or yogurt, depending on how many carbs you are supposed to eat at a meal. 
· Try to eat about the same amount of carbs at each meal. Do not \"save up\" your daily allowance of carbs to eat at one meal. 
· Proteins have very little or no carbs per serving. Examples of proteins are beef, chicken, turkey, fish, eggs, tofu, cheese, cottage cheese, and peanut butter. A serving size of meat is 3 ounces, which is about the size of a deck of cards. Examples of meat substitute serving sizes (equal to 1 ounce of meat) are 1/4 cup of cottage cheese, 1 egg, 1 tablespoon of peanut butter, and ½ cup of tofu. How can you eat out and still eat healthy? · Learn to estimate the serving sizes of foods that have carbohydrate. If you measure food at home, it will be easier to estimate the amount in a serving of restaurant food. · If the meal you order has too much carbohydrate (such as potatoes, corn, or baked beans), ask to have a low-carbohydrate food instead. Ask for a salad or green vegetables. · If you use insulin, check your blood sugar before and after eating out to help you plan how much to eat in the future. · If you eat more carbohydrate at a meal than you had planned, take a walk or do other exercise. This will help lower your blood sugar. What else should you know? · Limit saturated fat, such as the fat from meat and dairy products. This is a healthy choice because people who have diabetes are at higher risk of heart disease.  So choose lean cuts of meat and nonfat or low-fat dairy products. Use olive or canola oil instead of butter or shortening when cooking. · Don't skip meals. Your blood sugar may drop too low if you skip meals and take insulin or certain medicines for diabetes. · Check with your doctor before you drink alcohol. Alcohol can cause your blood sugar to drop too low. Alcohol can also cause a bad reaction if you take certain diabetes medicines. Follow-up care is a key part of your treatment and safety. Be sure to make and go to all appointments, and call your doctor if you are having problems. It's also a good idea to know your test results and keep a list of the medicines you take. Where can you learn more? Go to http://ann marie-perlita.info/. Enter B205 in the search box to learn more about \"Learning About Diabetes Food Guidelines. \" Current as of: December 7, 2017 Content Version: 11.7 © 0233-0577 [employer]. Care instructions adapted under license by [employer] (which disclaims liability or warranty for this information). If you have questions about a medical condition or this instruction, always ask your healthcare professional. [address] any warranty or liability for your use of this information. Introducing [facility]! [facility] introduces [employer] patient portal. Now you can access parts of your medical record, email your doctor's office, and request medication refills online. 1. In your internet browser, go to [email or web address] 2. Click on the First Time User? Click Here link in the Sign In box. You will see the New Member Sign Up page. 3. Enter your [employer] Access Code exactly as it appears below. You will not need to use this code after youve completed the sign-up process. If you do not sign up before the expiration date, you must request a new code. · I[employer] Access Code: [email or web address] Expires: 8/30/2018 11:04 PM 
 
 4. Enter the last four digits of your Social Security Number (xxxx) and Date of Birth (mm/dd/yyyy) as indicated and click Submit. You will be taken to the next sign-up page. 5. Create a [employer] ID. This will be your [employer] login ID and cannot be changed, so think of one that is secure and easy to remember. 6. Create a [employer] password. You can change your password at any time. 7. Enter your Password Reset Question and Answer. This can be used at a later time if you forget your password. 8. Enter your e-mail address. You will receive e-mail notification when new information is available in [address] 9. Click Sign Up. You can now view and download portions of your medical record. 10. Click the Download Summary menu link to download a portable copy of your medical information. If you have questions, please visit the Frequently Asked Questions section of the [employer] website. Remember, [employer] is NOT to be used for urgent needs. For medical emergencies, dial 911. Now available from your iPhone and Android! Please provide this summary of care documentation to your next provider. Your primary care clinician is listed as Edilia Jackson. If you have any questions after today's visit, please call [phone].

## 2018-08-08 NOTE — PROGRESS NOTES
Khoa Tejeda is a 37 y.o.  female and presents with    Chief Complaint   Patient presents with    Labs       Subjective:  Ms. Moses Jarvis presents today to review her most recent lab work. She still has complaints of generalized joint pain. She denies swelling in her joints. She reports stiffness which is more prominent upon awakening in the morning. After about an hour of moving around the pain will subside but will increase as the day progresses. She will occasionally take tylenol or ibuprofen for pain. 24 Hour Diet Recall:  Breakfast: ham and egg sandwich from [employer], vitamin water  Lunch: rotisserie chicken with wheat bread  Dinner: taco, cranberry juice    Additional Concerns: No        Patient Active Problem List   Diagnosis Code    Severe obesity (BMI 35.0-39.9) ([facility]) E66.01    Cigarette smoker F17.210    Type 2 diabetes mellitus with hyperglycemia, without long-term current use of insulin ([facility]) E11.65    Mixed hyperlipidemia E78.2     Current Outpatient Prescriptions   Medication Sig Dispense Refill    ibuprofen (MOTRIN) 800 mg tablet Take 800 mg by mouth. 1    metFORMIN (GLUCOPHAGE) 500 mg tablet Take 1 Tab by mouth two (2) times daily (with meals).  30 Tab 1     No Known Allergies  Past Medical History:   Diagnosis Date    Diabetes ([address]     Hypercholesterolemia      Past Surgical History:   Procedure Laterality Date    HX GYN       x 2     Family History   Problem Relation Age of Onset    Psychiatric Disorder Mother      Schizophrenia    No Known Problems Father     Thyroid Disease Maternal Grandmother     Hypertension Maternal Grandmother     Hypertension Maternal Grandfather      Social History   Substance Use Topics    Smoking status: Current Every Day Smoker     Packs/day: 0.50     Years: 20.00    Smokeless tobacco: Never Used    Alcohol use Yes      Comment: rarely       ROS   History obtained from the patient  General ROS: negative for - chills or fever  Respiratory ROS: no cough, shortness of breath, or wheezing  Cardiovascular ROS: no chest pain or dyspnea on exertion  Gastrointestinal ROS: no abdominal pain, change in bowel habits, or black or bloody stools  Genito-Urinary ROS: positive for - urine frequency   Musculoskeletal ROS: positive for - joint pain    All other systems reviewed and are negative.       Objective:  Vitals:    08/08/18 0901 08/08/18 0904   BP: 145/81 138/84   Pulse: 78    Resp: 18    Temp: 96.8 °F (36 °C)    TempSrc: Oral    SpO2: 96%    Weight: 224 lb (101.6 kg)    Height: 5' 5.5\" (1.664 m)    PainSc:   6    PainLoc: Generalized    LMP: 08/01/2018       PE  General appearance - alert, well appearing, and in no distress  Mental status - normal mood, behavior, speech, dress, motor activity, and thought processes  Chest - clear to auscultation, no wheezes, rales or rhonchi, symmetric air entry  Heart - normal rate and regular rhythm  Musculoskeletal - no joint deformity or swelling, normal ROM   Extremities - peripheral pulses normal, no pedal edema, no clubbing or cyanosis, monofilament sensory exam is normal in both feet  Skin - normal coloration and turgor, no rashes, no suspicious skin lesions noted        LABS   Lab Results  Component Value Date/Time   WBC 8.4 07/31/2018 09:58 AM   HGB 14.8 07/31/2018 09:58 AM   HCT 45.9 07/31/2018 09:58 AM   PLATELET 682 74/71/3899 09:58 AM   MCV 97 (H) 07/31/2018 09:58 AM     Lab Results  Component Value Date/Time   Cholesterol, total 226 (H) 07/31/2018 09:58 AM   HDL Cholesterol 38 (L) 07/31/2018 09:58 AM   LDL, calculated 163 (H) 07/31/2018 09:58 AM   Triglyceride 126 07/31/2018 09:58 AM     Lab Results  Component Value Date/Time   TSH 0.65 07/31/2018 09:58 AM   T4, Free 1.2 07/31/2018 09:58 AM      Lab Results   Component Value Date/Time    Sodium 138 07/31/2018 09:58 AM    Potassium 4.7 07/31/2018 09:58 AM    Chloride 96 (L) 07/31/2018 09:58 AM    CO2 24 07/31/2018 09:58 AM    Anion gap 18.0 07/31/2018 09:58 AM    Glucose 278 (H) 07/31/2018 09:58 AM    BUN 12 07/31/2018 09:58 AM    Creatinine 0.4 (L) 07/31/2018 09:58 AM    BUN/Creatinine ratio 22 (H) 06/02/2018 01:54 AM    GFR est AA >60 06/02/2018 01:54 AM    GFR est non-AA >60 06/02/2018 01:54 AM    Calcium 9.0 07/31/2018 09:58 AM    Bilirubin, total 0.3 07/31/2018 09:58 AM    ALT (SGPT) 24 07/31/2018 09:58 AM    AST (SGOT) 19 07/31/2018 09:58 AM    Alk. phosphatase 69 07/31/2018 09:58 AM    Protein, total 6.9 07/31/2018 09:58 AM    Albumin 4.1 07/31/2018 09:58 AM    Globulin 2.8 07/31/2018 09:58 AM    A-G Ratio 1.5 07/31/2018 09:58 AM      Lab Results   Component Value Date/Time    Hemoglobin A1c 10.6 (H) 07/31/2018 09:58 AM            Assessment/Plan:    1. Type 2 Diabetes-a1c 10.6; discussed goal of < 7; discussed dietary changes; information for Diabetes classes held at [facility] given to patient; metformin 1000mg BID- may start with 1 tab daily then increase to 2 tabs daily after 1 week; Januvia; start losartan as kidney protectant     2. Hyperlipidemia-; start atorvastatin 20mg daily; discussed potential side effects of medication; recheck fasting lipid panel in 12 weeks    3. Obesity-discussed dietary and lifestyle modifications; discussed importance of change in diet; per patient unable to exercise due to generalized joint pain; made aware that most of weight gain is due to what is being taken in; exercise as tolerated    4.  Joint Pain- no erythema or edema noted today; symptoms appear to be consistent with osteoarthritis; trial of Naproxen BID; heat/ice to affected area; discussed weight loss; if still have pain at next appointment will draw STEVE panel, CRP, and Sed rate to r/o autoimmune disorder    Lab review: labs reviewed, I note that glycosylated hemoglobin abnormal and needs improvement, lipids LDL result does not yet meet goal      Today's Visit: Metformin, Januvia, Losartan, Lipitor, Naproxen, Retinal Exam    Health Maintenance: Retinal Exam- orders placed     I have discussed the diagnosis with the patient and the intended plan as seen in the above orders. The patient has received an after-visit summary and questions were answered concerning future plans. I have discussed medication side effects and warnings with the patient as well. I have reviewed the plan of care with the patient, accepted their input and they are in agreement with the treatment goals. Follow-up Disposition:  Return in about 1 month (around 9/8/2018) for follow up diabetes and joint pain. More than 1/2 of this 25 minute visit was spent in counseling and coordination of care, as described above.     ML MccloudC

## 2018-09-07 ENCOUNTER — OFFICE VISIT (OUTPATIENT)
Dept: FAMILY MEDICINE CLINIC | Age: 43
End: 2018-09-07

## 2018-09-07 VITALS
OXYGEN SATURATION: 100 % | BODY MASS INDEX: 35.68 KG/M2 | HEIGHT: 66 IN | RESPIRATION RATE: 18 BRPM | WEIGHT: 222 LBS | TEMPERATURE: 97.3 F | HEART RATE: 85 BPM | SYSTOLIC BLOOD PRESSURE: 127 MMHG | DIASTOLIC BLOOD PRESSURE: 76 MMHG

## 2018-09-07 DIAGNOSIS — E78.2 MIXED HYPERLIPIDEMIA: ICD-10-CM

## 2018-09-07 DIAGNOSIS — F17.210 CIGARETTE SMOKER: ICD-10-CM

## 2018-09-07 DIAGNOSIS — E11.65 TYPE 2 DIABETES MELLITUS WITH HYPERGLYCEMIA, WITHOUT LONG-TERM CURRENT USE OF INSULIN (HCC): Primary | ICD-10-CM

## 2018-09-07 DIAGNOSIS — E66.01 SEVERE OBESITY (BMI 35.0-39.9): ICD-10-CM
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
## 2018-09-07 DIAGNOSIS — M25.50 ARTHRALGIA, UNSPECIFIED JOINT: ICD-10-CM

## 2018-09-07 DIAGNOSIS — Z23 ENCOUNTER FOR IMMUNIZATION: ICD-10-CM

## 2018-09-07 LAB — GLUCOSE POC: 206 MG/DL

## 2018-09-07 RX ORDER — METFORMIN HYDROCHLORIDE 750 MG/1
750 TABLET, EXTENDED RELEASE ORAL DAILY
Qty: 30 TAB | Refills: 1 | Status: SHIPPED | OUTPATIENT
Start: 2018-09-07

## 2018-09-07 NOTE — PROGRESS NOTES
Reji Nesbitt is a 37 y.o.  female and presents with    Chief Complaint   Patient presents with    Medication Evaluation       Subjective:  Ms. Kathe West presents today for medication evaluation. She was seen 1 month ago and was found to have an a1c of 10.6. She had been a noncompliant diabetic. She was started on metformin 1000mg BID, Januvia 100mg and Losartan 25mg and atorvastatin 20mg for LDL of 163. She reports nausea with Metformin. Diabetes Mellitus:  She has diabetes mellitus, and  hyperlipidemia. Diabetic ROS - medication compliance: compliant all of the time, diabetic diet compliance: compliant most of the time. In addition she had complaints of generalized joint pain. Possibility of osteoarthritis discussed with patient. Naproxen was given and weight loss was discussed. She states pain has improved with Naproxen. She still wakes up a little stiff in the morning but not as bad as it was before. She states she fell down the stairs after mopping. She stepped wrong at the edge of the stairs. She hit her right arm and right hip. She hit her head but did not lose consciousness. She did not have any vision changes. She did not go to the ED for evaluation. She had some pain for a few days but states bruising has improved. She continues to smoke. She is at 1/2 PPD. She finds herself smoking more when she is stressed. Additional Concerns: No        Patient Active Problem List   Diagnosis Code    Severe obesity (BMI 35.0-39.9) ([facility]) E66.01    Cigarette smoker F17.210    Type 2 diabetes mellitus with hyperglycemia, without long-term current use of insulin ([facility]) E11.65    Mixed hyperlipidemia E78.2     Current Outpatient Prescriptions   Medication Sig Dispense Refill    metFORMIN (GLUCOPHAGE) 1,000 mg tablet Take 1 Tab by mouth two (2) times daily (with meals). 60 Tab 2    atorvastatin (LIPITOR) 20 mg tablet Take 1 Tab by mouth daily.  30 Tab 2    losartan (COZAAR) 25 mg tablet Take 1 Tab by mouth daily. 30 Tab 2    SITagliptin (JANUVIA) 100 mg tablet Take 1 Tab by mouth daily. 30 Tab 2    naproxen (NAPROSYN) 500 mg tablet Take 1 Tab by mouth two (2) times daily (with meals). 61 Tab 1     No Known Allergies  Past Medical History:   Diagnosis Date    Diabetes ([address]     Hypercholesterolemia      Past Surgical History:   Procedure Laterality Date    HX GYN       x 2     Family History   Problem Relation Age of Onset    Psychiatric Disorder Mother      Schizophrenia    No Known Problems Father     Thyroid Disease Maternal Grandmother     Hypertension Maternal Grandmother     Hypertension Maternal Grandfather      Social History   Substance Use Topics    Smoking status: Current Every Day Smoker     Packs/day: 0.50     Years: 20.00    Smokeless tobacco: Never Used    Alcohol use Yes      Comment: rarely       ROS   History obtained from the patient  General ROS: negative for - chills or fever  Respiratory ROS: no cough, shortness of breath, or wheezing  Cardiovascular ROS: no chest pain or dyspnea on exertion  Gastrointestinal ROS: positive for - nausea/vomiting  Genito-Urinary ROS: no dysuria, trouble voiding, or hematuria  Musculoskeletal ROS: positive for - generalized joint pain  Dermatological ROS: positive for - bruising    All other systems reviewed and are negative.       Objective:  Vitals:    18 0815   BP: 127/76   Pulse: 85   Resp: 18   Temp: 97.3 °F (36.3 °C)   TempSrc: Oral   SpO2: 100%   Weight: 222 lb (100.7 kg)   Height: 5' 5.5\" (1.664 m)   PainSc:   0 - No pain   LMP: 2018       PE  General appearance - alert, well appearing, and in no distress  Mental status - normal mood, behavior, speech, dress, motor activity, and thought processes  Chest - clear to auscultation, no wheezes, rales or rhonchi, symmetric air entry  Heart - normal rate and regular rhythm  Extremities - peripheral pulses normal, no pedal edema, no clubbing or cyanosis  Skin - healing bruises noted to right upper arm and right lateral thigh    LABS   9/7/2018  8:25 AM - Niko Alston LPN   Component Results   Component Value Flag Ref Range Units Status   Glucose    mg/dL Final       Assessment/Plan:    1. Type 2 Diabetes- POC glucose 206; discussed diabetic diet and importance of compliance; metformin 1000mg BID causing nausea- will switch to metformin 750mg XR; continue with Januvia; a1c in 8 weeks    2. Hyperlipidemia- doing well with atorvastatin; low fat diet; fasting lipid panel in 8 weeks    3. Obesity- diet and lifestyle modifications; continue to monitor    4. Generalized joint pain- improved with use Naproxen; continue    5. Tobacco Abuse- The patient was counseled on the dangers of tobacco use, and was advised to quit. Reviewed strategies to maximize success, including stress management. Total time spent in discussion: 3 minutes    6. Fall- discussed fall precautions and safety    Lab review: orders written for new lab studies as appropriate; see orders    Today's Visit: Metformin 750 XR    Health Maintenance:   Mammogram-ordered; patient to schedule  Influenza Vaccine- given today    I have discussed the diagnosis with the patient and the intended plan as seen in the above orders. The patient has received an after-visit summary and questions were answered concerning future plans. I have discussed medication side effects and warnings with the patient as well. I have reviewed the plan of care with the patient, accepted their input and they are in agreement with the treatment goals. Follow-up Disposition:  Return in about 2 months (around 11/7/2018) for review labs, please schedule with DR. CLANCY. More than 1/2 of this 25 minute visit was spent in counseling and coordination of care, as described above.     FRED Rudolph

## 2018-09-07 NOTE — PROGRESS NOTES
Jaspal Decker is a 37 y.o. female  Chief Complaint   Patient presents with    Medication Evaluation     1. Have you been to the ER, urgent care clinic since your last visit? Hospitalized since your last visit? No    2. Have you seen or consulted any other health care providers outside of the [address] [place] [address] since your last visit? Include any pap smears or colon screening.  No

## 2018-09-07 NOTE — ASSESSMENT & PLAN NOTE
Stable, based on history, physical exam and review of pertinent labs, studies and medications; meds reconciled; continue current treatment plan, lifestyle modifications recommended, medication compliance emphasized, recheck a1c in 8 weeks. Key Antihyperglycemic Medications             metFORMIN ER (GLUCOPHAGE XR) 750 mg tablet  (Taking) Take 1 Tab by mouth daily. SITagliptin (JANUVIA) 100 mg tablet  (Taking) Take 1 Tab by mouth daily. Other Key Diabetic Medications             atorvastatin (LIPITOR) 20 mg tablet  (Taking) Take 1 Tab by mouth daily. losartan (COZAAR) 25 mg tablet  (Taking) Take 1 Tab by mouth daily.         Lab Results   Component Value Date/Time    Hemoglobin A1c 10.6 07/31/2018 09:58 AM    Glucose 278 07/31/2018 09:58 AM    Creatinine 0.4 07/31/2018 09:58 AM    Microalb/Creat ratio (ug/mg creat.)  07/31/2018 09:58 AM      Comment:      ** Unable to calculate Microablumin/Creatinine Ratio due to low Microalbumin    Cholesterol, total 226 07/31/2018 09:58 AM    HDL Cholesterol 38 07/31/2018 09:58 AM    LDL, calculated 163 07/31/2018 09:58 AM    Triglyceride 126 07/31/2018 09:58 AM     Diabetic Foot and Eye Exam HM Status   Topic Date Due    Diabetic Foot Care  08/08/2019    Eye Exam  08/08/2019

## 2018-09-07 NOTE — MR AVS SNAPSHOT
[address] [place] [id number] 
[phone] Patient: Cory Escalante MRN: [id number] :[id number] Visit Information Date & Time Provider Department Dept. Phone Encounter #  
 2018  8:30 AM Annemarie Quezada [id number] Follow-up Instructions Return in about 2 months (around 2018) for review labs, please schedule with DR. CLANCY. Upcoming Health Maintenance Date Due Pneumococcal 19-64 Medium Risk (1 of 1 - PPSV23) 3/22/1994 DTaP/Tdap/Td series (1 - Tdap) 3/22/1996 PAP AKA CERVICAL CYTOLOGY 3/22/1996 Influenza Age 5 to Adult 2018 HEMOGLOBIN A1C Q6M 2019 MICROALBUMIN Q1 2019 LIPID PANEL Q1 2019 FOOT EXAM Q1 2019 EYE EXAM RETINAL OR DILATED Q1 2019 Allergies as of 2018  Review Complete On: 2018 By: Jesi Dexter NP No Known Allergies Current Immunizations  Never Reviewed No immunizations on file. Not reviewed this visit You Were Diagnosed With   
  
 Codes Comments Type 2 diabetes mellitus with hyperglycemia, without long-term current use of insulin (HCC)    -  Primary ICD-10-CM: E11.65 ICD-9-CM: 250.00, 790.29 Mixed hyperlipidemia     ICD-10-CM: E78.2 ICD-9-CM: 272.2 Severe obesity (BMI 35.0-39.9) (HCC)     ICD-10-CM: E66.01 
ICD-9-CM: 278.01 Cigarette smoker     ICD-10-CM: F17.210 ICD-9-CM: 305.1 Fall, initial encounter     ICD-10-CM: W19. Myrl Mon ICD-9-CM: E888.9 Vitals BP Pulse Temp Resp Height(growth percentile) Weight(growth percentile) 127/76 (BP 1 Location: Left arm, BP Patient Position: Sitting) 85 97.3 °F (36.3 °C) (Oral) 18 5' 5.5\" (1.664 m) 222 lb (100.7 kg) LMP SpO2 BMI OB Status Smoking Status 2018 100% 36.38 kg/m2 Having regular periods Current Every Day Smoker Vitals History BMI and BSA Data Body Mass Index Body Surface Area  
 36.38 kg/m 2 2.16 m 2 Preferred Pharmacy Pharmacy Name Phone [id number]- [address], [address] [phone] Your Updated Medication List  
  
   
This list is accurate as of 9/7/18  8:48 AM.  Always use your most recent med list.  
  
  
  
  
 atorvastatin 20 mg tablet Commonly known as:  LIPITOR Take 1 Tab by mouth daily. losartan 25 mg tablet Commonly known as:  COZAAR Take 1 Tab by mouth daily. metFORMIN  mg tablet Commonly known as:  GLUCOPHAGE XR Take 1 Tab by mouth daily. naproxen 500 mg tablet Commonly known as:  NAPROSYN Take 1 Tab by mouth two (2) times daily (with meals). SITagliptin 100 mg tablet Commonly known as:  Sherle Mantel Take 1 Tab by mouth daily. Prescriptions Sent to Pharmacy Refills  
 metFORMIN ER (GLUCOPHAGE XR) 750 mg tablet 1 Sig: Take 1 Tab by mouth daily. Class: Normal  
 Pharmacy: [facility] [place], [address] [facility] Ph #: [phone] Route: Oral  
  
We Performed the Following AMB POC GLUCOSE BLOOD, BY GLUCOSE MONITORING DEVICE [44895 CPT(R)] VT SMOKING AND TOBACCO USE CESSATION 3 - 10 MINUTES [08188 CPT(R)] Follow-up Instructions Return in about 2 months (around 11/7/2018) for review labs, please schedule with DR. CLANCY. To-Do List   
 10/29/2018 Lab:  HEMOGLOBIN A1C WITH EAG   
  
 10/29/2018 Lab:  LIPID PANEL   
  
 10/29/2018 Lab:  METABOLIC PANEL, COMPREHENSIVE Introducing [facility]! [employer] introduces [email or web address] patient portal. Now you can access parts of your medical record, email your doctor's office, and request medication refills online. 1. In your internet browser, go to [email or web address] 2. Click on the First Time User? Click Here link in the Sign In box. You will see the New Member Sign Up page. 3. Enter your [employer] Access Code exactly as it appears below. You will not need to use this code after youve completed the sign-up process. If you do not sign up before the expiration date, you must request a new code. · [employer] Access Code: [email or web address] Expires: 12/6/2018  8:48 AM 
 
4. Enter the last four digits of your Social Security Number (xxxx) and Date of Birth (mm/dd/yyyy) as indicated and click Submit. You will be taken to the next sign-up page. 5. Create a [employer] ID. This will be your [employer] login ID and cannot be changed, so think of one that is secure and easy to remember. 6. Create a [employer] password. You can change your password at any time. 7. Enter your Password Reset Question and Answer. This can be used at a later time if you forget your password. 8. Enter your e-mail address. You will receive e-mail notification when new information is available in [address] 9. Click Sign Up. You can now view and download portions of your medical record. 10. Click the Download Summary menu link to download a portable copy of your medical information. If you have questions, please visit the Frequently Asked Questions section of the [employer] website. Remember, [employer] is NOT to be used for urgent needs. For medical emergencies, dial 911. Now available from your iPhone and Android! Please provide this summary of care documentation to your next provider. Your primary care clinician is listed as Crystal Salgado. If you have any questions after today's visit, please call [phone].

## 2018-09-07 NOTE — ASSESSMENT & PLAN NOTE
Improving, based on history, physical exam and review of pertinent labs, studies and medications; meds reconciled; continue current treatment plan, lifestyle modifications recommended. Key Obesity Meds             metFORMIN ER (GLUCOPHAGE XR) 750 mg tablet  (Taking) Take 1 Tab by mouth daily.         Lab Results   Component Value Date/Time    Hemoglobin A1c 10.6 07/31/2018 09:58 AM    Glucose 278 07/31/2018 09:58 AM    Cholesterol, total 226 07/31/2018 09:58 AM    HDL Cholesterol 38 07/31/2018 09:58 AM    LDL, calculated 163 07/31/2018 09:58 AM    Triglyceride 126 07/31/2018 09:58 AM    TSH 0.65 07/31/2018 09:58 AM    Sodium 138 07/31/2018 09:58 AM    Potassium 4.7 07/31/2018 09:58 AM    ALT (SGPT) 24 07/31/2018 09:58 AM    AST (SGOT) 19 07/31/2018 09:58 AM

## 2018-10-22 DIAGNOSIS — M25.50 GENERALIZED JOINT PAIN: ICD-10-CM

## 2018-10-24 RX ORDER — NAPROXEN 500 MG/1
TABLET ORAL
Qty: 60 TAB | Refills: 1 | Status: SHIPPED | OUTPATIENT
Start: 2018-10-24

## 2018-10-29 DIAGNOSIS — E11.65 TYPE 2 DIABETES MELLITUS WITH HYPERGLYCEMIA, WITHOUT LONG-TERM CURRENT USE OF INSULIN (HCC): ICD-10-CM

## 2018-10-29 DIAGNOSIS — E78.2 MIXED HYPERLIPIDEMIA: ICD-10-CM

## 2018-11-09 ENCOUNTER — DOCUMENTATION ONLY (OUTPATIENT)
Dept: FAMILY MEDICINE CLINIC | Age: 43
End: 2018-11-09

## 2018-11-09 NOTE — LETTER
11/9/2018 Abdiaziz Mata [address] [facility] [id number] Dear Ms. Abdiaziz Mata, We had an appointment reserved for you on 11/7/18 and were concerned when you did not show or call within 24 hours to cancel the appointment. Our policy is to call patients two days prior to their appointment to remind them of the date and time. We perform these calls as a courtesy to our patients and to allow us the opportunity to rebook the time slot should the appointment not be necessary. Recognizing that everyones time is valuable and that appointment time is limited, we ask that you provide 24 hours notice if you are unable to keep your appointment. Please call us at your earliest convenience to reschedule your appointment as your provider felt it was important to see you. Thank you for your anticipated cooperation. [address] [place] [id number] [phone]

## 2019-02-22 ENCOUNTER — TELEPHONE (OUTPATIENT)
Dept: FAMILY MEDICINE CLINIC | Age: 44
End: 2019-02-22

## 2019-02-22 NOTE — TELEPHONE ENCOUNTER
Left voicemail message regarding scheduling a diabetic follow-up appointment. HM letter sent. Call back number left. Awaiting patient feedback.

## 2019-02-22 NOTE — LETTER
February 22, 2019 Edy Melendez [address] [place] [phone] Dear Edy Melendez, Your doctor is interested in not only helping you feel better when you are sick, but also in keeping you from getting sick in the first place. In the spirit of maintaining your good health, our  
records indicate that you may be due for the following: 
 
Health Maintenance Due Topic Date Due  Pneumococcal Vaccine (1 of 1 - PPSV23) 03/22/1994  
 DTaP/Tdap/Td  (1 - Tdap) 03/22/1996  Cervical Cancer Screening  03/22/1996  Hemoglobin A1C    01/31/2019 Please contact our office to make an appointment at your convenience, either by phone at [phone] or via [phone] [place] [address]. If you are not due for any of the preventive services listed above please notify us so we can update your records. We look forward to hearing from you soon. Sincerely, [address] [facility] 
[phone]